# Patient Record
Sex: FEMALE | Race: WHITE | NOT HISPANIC OR LATINO
[De-identification: names, ages, dates, MRNs, and addresses within clinical notes are randomized per-mention and may not be internally consistent; named-entity substitution may affect disease eponyms.]

---

## 2022-04-27 PROBLEM — Z00.00 ENCOUNTER FOR PREVENTIVE HEALTH EXAMINATION: Status: ACTIVE | Noted: 2022-04-27

## 2022-04-28 ENCOUNTER — APPOINTMENT (OUTPATIENT)
Dept: GASTROENTEROLOGY | Facility: CLINIC | Age: 22
End: 2022-04-28

## 2022-07-08 ENCOUNTER — APPOINTMENT (OUTPATIENT)
Dept: GASTROENTEROLOGY | Facility: CLINIC | Age: 22
End: 2022-07-08

## 2022-07-08 VITALS
SYSTOLIC BLOOD PRESSURE: 102 MMHG | TEMPERATURE: 97.7 F | HEART RATE: 92 BPM | BODY MASS INDEX: 20.64 KG/M2 | HEIGHT: 67 IN | OXYGEN SATURATION: 99 % | WEIGHT: 131.5 LBS | DIASTOLIC BLOOD PRESSURE: 65 MMHG

## 2022-07-08 PROCEDURE — 36415 COLL VENOUS BLD VENIPUNCTURE: CPT

## 2022-07-08 PROCEDURE — 99204 OFFICE O/P NEW MOD 45 MIN: CPT | Mod: 25

## 2022-07-08 RX ORDER — HYOSCYAMINE SULFATE 0.12 MG/1
0.12 TABLET SUBLINGUAL 3 TIMES DAILY
Qty: 30 | Refills: 1 | Status: ACTIVE | COMMUNITY
Start: 2022-07-08 | End: 1900-01-01

## 2022-07-08 NOTE — PHYSICAL EXAM
[General Appearance - Alert] : alert [General Appearance - In No Acute Distress] : in no acute distress [General Appearance - Well Nourished] : well nourished [General Appearance - Well Developed] : well developed [Respiration, Rhythm And Depth] : normal respiratory rhythm and effort [Exaggerated Use Of Accessory Muscles For Inspiration] : no accessory muscle use [Auscultation Breath Sounds / Voice Sounds] : lungs were clear to auscultation bilaterally [Apical Impulse] : the apical impulse was normal [Heart Rate And Rhythm] : heart rate was normal and rhythm regular [Heart Sounds] : normal S1 and S2 [Bowel Sounds] : normal bowel sounds [Abdomen Soft] : soft [Abdomen Tenderness] : non-tender [] : no hepato-splenomegaly

## 2022-07-11 LAB
24R-OH-CALCIDIOL SERPL-MCNC: 82.1 PG/ML
ALBUMIN SERPL ELPH-MCNC: 4.8 G/DL
ALP BLD-CCNC: 71 U/L
ALT SERPL-CCNC: 13 U/L
ANION GAP SERPL CALC-SCNC: 14 MMOL/L
AST SERPL-CCNC: 21 U/L
BASOPHILS # BLD AUTO: 0.04 K/UL
BASOPHILS NFR BLD AUTO: 0.9 %
BILIRUB SERPL-MCNC: 0.5 MG/DL
BUN SERPL-MCNC: 8 MG/DL
CALCIUM SERPL-MCNC: 9.9 MG/DL
CHLORIDE SERPL-SCNC: 103 MMOL/L
CO2 SERPL-SCNC: 25 MMOL/L
CREAT SERPL-MCNC: 0.97 MG/DL
EGFR: 85 ML/MIN/1.73M2
EOSINOPHIL # BLD AUTO: 0.12 K/UL
EOSINOPHIL NFR BLD AUTO: 2.7 %
ERYTHROCYTE [SEDIMENTATION RATE] IN BLOOD BY WESTERGREN METHOD: 10 MM/HR
FERRITIN SERPL-MCNC: 59 NG/ML
GLUCOSE SERPL-MCNC: 83 MG/DL
HCT VFR BLD CALC: 44.1 %
HGB BLD-MCNC: 14.3 G/DL
IGA SER QL IEP: 400 MG/DL
IMM GRANULOCYTES NFR BLD AUTO: 0.2 %
IRON SATN MFR SERPL: 42 %
IRON SERPL-MCNC: 168 UG/DL
LYMPHOCYTES # BLD AUTO: 1.73 K/UL
LYMPHOCYTES NFR BLD AUTO: 39.1 %
MAN DIFF?: NORMAL
MCHC RBC-ENTMCNC: 30.6 PG
MCHC RBC-ENTMCNC: 32.4 GM/DL
MCV RBC AUTO: 94.2 FL
MONOCYTES # BLD AUTO: 0.62 K/UL
MONOCYTES NFR BLD AUTO: 14 %
NEUTROPHILS # BLD AUTO: 1.91 K/UL
NEUTROPHILS NFR BLD AUTO: 43.1 %
PLATELET # BLD AUTO: 244 K/UL
POTASSIUM SERPL-SCNC: 4 MMOL/L
PROT SERPL-MCNC: 7.7 G/DL
RBC # BLD: 4.68 M/UL
RBC # FLD: 12.8 %
SODIUM SERPL-SCNC: 142 MMOL/L
T3 SERPL-MCNC: 147 NG/DL
T4 SERPL-MCNC: 11.3 UG/DL
TIBC SERPL-MCNC: 397 UG/DL
UIBC SERPL-MCNC: 228 UG/DL
VIT B12 SERPL-MCNC: 493 PG/ML
WBC # FLD AUTO: 4.43 K/UL

## 2022-07-14 LAB
TTG IGA SER IA-ACNC: <1.2 U/ML
TTG IGA SER-ACNC: NEGATIVE
TTG IGG SER IA-ACNC: <1.2 U/ML
TTG IGG SER IA-ACNC: NEGATIVE

## 2022-07-14 NOTE — HISTORY OF PRESENT ILLNESS
[FreeTextEntry1] : I saw patient Michael Walker in the office today.  The patient is a 21-year-old female who and generally enjoys good health.  Patient has no history of hypertension or cardiac issues and her appetite is generally good with no dysphagia or unexplained weight loss.  Patient's bowel movements are usually normal with no blood in the stool or on the toilet tissue.  The patient notices intermittent episodes of abdominal bloating and discomfort.  This is usually in the upper abdominal area but is not associated with any nausea vomiting or regurgitation.  The patient eats a fairly healthy diet and denies a history compatible with lactose intolerant.  The patient states that the discomfort is usually postprandial in nature.  She has not identified any dietary trigger but states that it may be worse during periods of stress.  The patient has 1 to 2 cups of caffeine a day rarely has ethanol and does not smoke.  The patient has not had recent blood work.  The patient states she is up-to-date on her gynecological exams

## 2022-07-14 NOTE — ASSESSMENT
[FreeTextEntry1] : Michael is a 21-year-old female who enjoys good health.  The gastrointestinal perspective the patient is noting intermittent episodes of dyspepsia with bloating and abdominal discomfort.  There are no red flag signs such as unexplained weight loss blood in the stool or fever.  The symptoms seem to be exacerbated during periods of stress.  Most likely diagnosis is irritable bowel with intermittent episodes of spasm.  I told the patient to start a high potency probiotic and soluble fiber.  She  was also given a therapeutic trial of a low-dose antispasmodic which she will use only when she feels the symptoms are occurring.  I obtained extensive blood work on the patient including testing for celiac disease and inflammatory markers.  Once the blood work results are and I will give the patient a call and we will discuss further evaluation if necessary.  If the symptoms persist the patient will require imaging of the small intestine.

## 2022-07-18 LAB
TESTOST FREE SERPL-MCNC: 1.5 PG/ML
TESTOST SERPL-MCNC: 24.3 NG/DL

## 2023-02-13 ENCOUNTER — APPOINTMENT (OUTPATIENT)
Dept: GASTROENTEROLOGY | Facility: CLINIC | Age: 23
End: 2023-02-13
Payer: COMMERCIAL

## 2023-02-13 ENCOUNTER — NON-APPOINTMENT (OUTPATIENT)
Age: 23
End: 2023-02-13

## 2023-02-13 VITALS
SYSTOLIC BLOOD PRESSURE: 130 MMHG | WEIGHT: 133 LBS | OXYGEN SATURATION: 99 % | HEART RATE: 101 BPM | HEIGHT: 67 IN | BODY MASS INDEX: 20.88 KG/M2 | TEMPERATURE: 97.5 F | DIASTOLIC BLOOD PRESSURE: 60 MMHG

## 2023-02-13 DIAGNOSIS — K59.9 FUNCTIONAL INTESTINAL DISORDER, UNSPECIFIED: ICD-10-CM

## 2023-02-13 DIAGNOSIS — K92.1 MELENA: ICD-10-CM

## 2023-02-13 DIAGNOSIS — K58.9 IRRITABLE BOWEL SYNDROME W/OUT DIARRHEA: ICD-10-CM

## 2023-02-13 PROCEDURE — 99213 OFFICE O/P EST LOW 20 MIN: CPT

## 2023-02-13 PROCEDURE — 82270 OCCULT BLOOD FECES: CPT

## 2023-02-13 NOTE — ASSESSMENT
[FreeTextEntry1] : The patient is a 22-year-old female has been diagnosed with mild irritable bowel.  The patient has been doing remarkably well on fiber and high potency probiotic.  Most recently the patient had a self-limiting episode of bright red blood on the toilet tissue.  Today's physical exam reveals a small external hemorrhoid but the stool above is clearly brown and guaiac negative.  I reassured the patient and her mother who was present that this is more than likely local bleeding.  Patient was told to avoid straining and not to force herself to have a bowel movement.  If the symptoms become more persistent then she will need further evaluation which could include a colonoscopy or a consultation with a colorectal surgeon.  The patient promised she will get back to me if the bleeding should recur.

## 2023-02-13 NOTE — HISTORY OF PRESENT ILLNESS
[FreeTextEntry1] : I saw patient Michael Ponce in the office today.  The patient is a 22-year-old female who been seen last year for irritable bowel like symptoms.  The patient had extensive blood work which was negative including that for celiac disease the patient was given a prescription for an antispasmodic but has not required the medication.  Patient is taking a high potency probiotic and fiber and states that her bowel movements are usually normal.  Occasionally she may have the sensation of incomplete evacuation.  When she is under stress or has a deadline occasionally she forces herself to have a bowel movement.  Most recently the patient had several episodes of bright red blood on the toilet tissue and in the toilet water.  This was noted for 3 consecutive bowel movements after which the stool has been brown with no fresh or old blood. The  patient denies any significant perianal pain or burning.  Michael does not abuse tobacco caffeine or ethanol. Tetracycline Pregnancy And Lactation Text: This medication is Pregnancy Category D and not consider safe during pregnancy. It is also excreted in breast milk.

## 2023-02-13 NOTE — REVIEW OF SYSTEMS
[Fever] : no fever [Chills] : no chills [Feeling Tired] : not feeling tired [Recent Weight Loss (___ Lbs)] : no recent weight loss [Negative] : Respiratory

## 2023-02-13 NOTE — PHYSICAL EXAM
[Normal] : heart rate was normal and rhythm regular, normal S1 and S2, no murmurs [Bowel Sounds] : normal bowel sounds [Abdomen Tenderness] : non-tender [Abdomen Soft] : soft [] : no hepatosplenomegaly [Normal Sphincter Tone] : normal sphincter tone [No Rectal Mass] : no rectal mass [Occult Blood] : negative occult blood [de-identified] : There is a small external hemorrhoid.  Stool is brown and guaiac negative.

## 2023-03-23 ENCOUNTER — APPOINTMENT (OUTPATIENT)
Dept: GASTROENTEROLOGY | Facility: CLINIC | Age: 23
End: 2023-03-23